# Patient Record
Sex: FEMALE | Race: WHITE | URBAN - METROPOLITAN AREA
[De-identification: names, ages, dates, MRNs, and addresses within clinical notes are randomized per-mention and may not be internally consistent; named-entity substitution may affect disease eponyms.]

---

## 2018-03-29 ENCOUNTER — APPOINTMENT (RX ONLY)
Dept: URBAN - METROPOLITAN AREA CLINIC 295 | Facility: CLINIC | Age: 33
Setting detail: DERMATOLOGY
End: 2018-03-29

## 2018-03-29 VITALS — TEMPERATURE: 97.6 F

## 2018-03-29 DIAGNOSIS — L72.8 OTHER FOLLICULAR CYSTS OF THE SKIN AND SUBCUTANEOUS TISSUE: ICD-10-CM

## 2018-03-29 PROBLEM — F41.9 ANXIETY DISORDER, UNSPECIFIED: Status: ACTIVE | Noted: 2018-03-29

## 2018-03-29 PROBLEM — L70.0 ACNE VULGARIS: Status: ACTIVE | Noted: 2018-03-29

## 2018-03-29 PROCEDURE — ? COUNSELING

## 2018-03-29 PROCEDURE — ? PATIENT SPECIFIC COUNSELING

## 2018-03-29 PROCEDURE — 99202 OFFICE O/P NEW SF 15 MIN: CPT

## 2018-03-29 ASSESSMENT — LOCATION DETAILED DESCRIPTION DERM: LOCATION DETAILED: RIGHT SUPERIOR LATERAL MALAR CHEEK

## 2018-03-29 ASSESSMENT — LOCATION ZONE DERM: LOCATION ZONE: FACE

## 2018-03-29 ASSESSMENT — LOCATION SIMPLE DESCRIPTION DERM: LOCATION SIMPLE: RIGHT CHEEK

## 2018-03-29 NOTE — PROCEDURE: PATIENT SPECIFIC COUNSELING
Patient's pcp gave cephalexin 250mg tid x7 days. Reassured patient this is appropriate, she will start tx today
Detail Level: Zone